# Patient Record
Sex: FEMALE | Race: WHITE
[De-identification: names, ages, dates, MRNs, and addresses within clinical notes are randomized per-mention and may not be internally consistent; named-entity substitution may affect disease eponyms.]

---

## 2019-07-04 NOTE — NUR
PT AOX3 WITH A LOT OF ANXIETY AND NERVOUSNESS RELATED TO DETOXING FROM DRUGS.
PT IS TREATED WITH ATIVAN FOR ANXIETY. PT PACES IN ROOM AND IS DISCOURAGED TO
LEAVE THE ROOM FOR SMOKING OUTSIDE. NICOTINE PATCH IN PLACE. PT HAS PAIN IN
HER HANDS WITH BILATERAL CELLULITIS AND IS TREATED PER EMAR. PT IS AGITATED
OFF AND ON, BUT SO FAR IS REDIRECTABLE. WILL CONTINUE TO MONITOR.

## 2019-07-04 NOTE — NUR
SHIFT SUMMARY:
PATIENT ARRIVED TO THE FLOOR FROM ER VIA WC. TRANSFERRED SELF TO BED. SHE WAS
VERY ANXIOUS, BUT AOX3, FLAT EFFECT, AND WITHDRAWN. THEY STATE THAT SHE ACTED
EMBARRED DOWN IN THE ER. PATIENT ADMITTED TO DAILY IV HERION USE UP TO 4-5
TIMES A DAY, AND METH USE FOR SUBSITUTE. LAST TIME USED WAS RIGHT BEFORE
COMMING TO THE HOSPTIAL. SHE CAME IN DO TO WORSENING ABSCESS TO THE BILATERAL
HANDS AND LEFT AC AREA. BILATERAL HANDS ARE SWOLLEN WITH CELLULITIS THE RIGHT
HAS A TRACK TO THE SURFACE AND IS DRAINING, MILD SEROUS SANGUOUS. THE LEFT HAS
NOT OPEN AS OF YET, THE LEFT AC HAS OPENED AND HAS A DEEP WHOLE AND DRAINING,
SHE REFUSED TO HAVE THEM COVERED OR MEASURED AS THEY DID IT IN THE ER. SHE HAS
DENIED PAIN BUT HAS SHOWN SIGNS OF WITHDRAWL WITH IN HOURS OF BEING HERE. SHE
IS VERY ANXIOUS UP AND DOWN IN HER ROOM PACING THE FLOOR AND CONSTANTLY MOVING
AROUND STATING THAT SHE FEELS SHE WILL LOSE IT, ATIVAN HAS BEEN GIVEN AFTER A
CALL TO THE DOCTOR AND IT CHANGED TO EVERY 3 HOURS. SHE WAS FOUND LATER TRYING
TO GO OUTSIDE TO SMOKE DISPITE THE NICOTINE PATCH SHE HAS ON. INFORMED HER SHE
IS NOT TO LEAVE TILL WHILE THE IV IS IN PLACE. SHE WAS NOT HAPPY ABOUT THAT.
ENCOURAGED HER TO TRY TO REST BUT SHE HAS NOT. SHE DID HAVE A VISIT FROM HER
DAUGHTER AND SON, WHICH SON WAS AS YOUNG AS 5 YEARS OLD. THIS DID NOT HELP HER
WANTING TO GO HOME. SO FAR SHE HAS REMAINED CALM AND HAS NOT ESCALATED TO
BEHAVIORAL ISSUES. DR. JOAQUIN WAS CONSULTED FOR POSSIBLE I&D. NO OTHER CHANGES
HAVE OCCURED, WILL REPORT TO DAY SHIFT RN.

## 2019-07-04 NOTE — NUR
PATIENT JUST TRIED TO GO OUTSIDE TO SMOKE, FOUND BY ELEVATOR READY TO GO
DOWNSTAIRS, SHE IS VERY ANCIOUS AND JITTERY, WITHDRAWLING FROM HER HERION AND
METH USE ON ADMISSION, AS SOON AS WE GOT BACK TO ROOM SHE TEXTED SOMEONE ON
HER PHONE. INFORMED HER THAT WE CAN NOT HAVE HER GO OUTSIDE TO SMOKE THAT AS
LONG AS THE IV ARE IN SHE CAN NOT LEAVE. SHE COOPERATED TO GO BACK TO THE
ROOM.

## 2019-07-05 NOTE — NUR
Ambulatory in ROOM, FLAT AFFECT, MINIMAL VERBAL, STANDS, TRANSFERS TO [facility],
DENIES NEED TO VOID WHEN OFFERED, DECLINES. SIGNS BLOOD CONSENT.
History, Chart, Medications and Allergies reviewed before start of
procedure.Lungs clear T/O to Auscultation.
Patient confirms NPO status and agrees with scheduled surgery.
Pre-Op teaching done. Pt verbalizes understanding.

## 2019-07-05 NOTE — NUR
PT IS ANSWERING AND TALKING WITH RN. STATES THAT 25MCG IV FENT DID NOT "DO
MUCH" TO PAIN. PT NOW ASKING TO SOMETHING TO DRINK AND WANTS TO GO BACK TO
ROOM. REPORT WAS GIVEN TO TORI VASQUEZ. PT IS STABLE AT THIS TIME TO BE TRANSFERED
TO MEDICAL FLOOR.

## 2019-07-05 NOTE — NUR
SHIFT SUMMARY
PT VERY FLAT AFFECT DOESN'T SEEM TO RESPOND TO QUESTIONS, KIND OF A CONSTANT
BLANK STARE. NPO SINCE MIDNIGHT. SHE TENDS TO WANDER HALLS WITH GOWN OPEN IN
BACK C BLANK STARE.
REDIRECTED TO ROOM X3. SHE LAYED IN BED AND DOZED ON AND OFF, TOSSING IN
BED ON AND OFF T/O NIGHT.

## 2019-07-05 NOTE — NUR
PT IS AOX3 VERY FLAT AFFECT. PT IS STILL WITHDRAWING FROM HER DRUG USE. PT WAS
OUT MOST OF THE DAY FOR IND TO HER L ELBOW AND BOTH HANDS. WOUND VAC ON L
ELBOW IS RUNNING WELL AT THIS TIME AND HAND BANDAGES ARE CLEAN. PT TREATED FOR
PAIN PER EMAR. PT IS A ONE PERSON ELVIS AT THIS TIME AS SHE RECOVERS FROM
SEDATATION AND GETS HELP WITH WOUND VAC AND IV LINES. VITALS ARE STABLE WILL
CONTINUE TO MONITOR. PT DOES NOT USE CALL LIGHT. BED ALARM IN PLACE.

## 2019-07-06 NOTE — NUR
SHIFT SUMMARY
PT VERY IRRITABLE FLAT AFFECT DOESN'T LIKE TO RESPOND. C/O PAIN AND MEDICATED
PER EMAR FOR PAIN AND ANXIETY. VERY IRRITABLE ABOUT THE IV AND PUMP GOING OFF
MULTIPLE TIMES. WOUND VAC TO L ELBOW DRAINING ABOUT 50 OF DARK RED BLOOD. IV
GOT PULLED OUT FROM L SIDE OF NECK AND SHE WON'T LET US TOUCH R FOOT IV SINCE
SHE SAYS IT HURTS TOO MUCH. IV SITE LOST AND SHE REFUSED ANOTHER IV AT THE
MOMENT. WCTM. PASS ON TO DAY RN.

## 2019-07-06 NOTE — NUR
DRESSING CHANGE
 
MARA DA SILVA CAME AND CHANGED BOTH HAND DRESSINGS, BILAT HANDS PACKED WITH
IODOFORM PACKING AND COVERED WITH GAUZE 4X4'S, GAUZE ROLL, AND ACE WRAP, PT
CRIED OUT AND SWORE DURING DRESSING CHANGES, EXPLAINED TO THE PT THE
IMPORTANCE OF DRESSING CHANGES, AND IV ANTIBIOTICS

## 2019-07-06 NOTE — NUR
SUMMARY
 
PT RESTING QUIETLY IN BED, HAS BEEN MED PER EMAR FOR PAIN AND ANXIETY, PT HAS
BEEN INDEPENDENT IN THE ROOM, PT HAS HAD FAMILY IN TO VISIT, PT HAS BEEN OUT
OF THE ROOM TO GO FOR A WALK, WOUND VAC REMAINS INTACT, VSS, NO ACUTE CHANGES,
WILL CONT TO MONITOR

## 2019-07-07 NOTE — NUR
SUMMARY
 
PT RESTING QUIETLY IN BED, PT WAKES EASILY, PT HAS HAD BOTH HAND DRESSINGS
CHANGED AND THE WOUND VAC DRESSING CHANGED TODAY BY DR BARBOZA AND BARBY DA SILVA,
PAIN MEDS INCREASED TO Q4HP, FOR INCREASED PAIN, PT HAS HAD FAMILY COME IN TO
VISIT AND PT HAS BEEN UP TO WALK, VSS, NO ACUTE CHANGES, WILL CONT TO MONITOR

## 2019-07-07 NOTE — NUR
SUMMARY: A/OX3, INDEPENDENT TO BSC AND SPECIFIES NEEDS. SHE'S BEEN VERY TIRED
THIS SHIFT AND SLEPT MAJORITY OF NOCTE. SHE AWOKE BRIEFLY TO ANSWER Q'S
SUCCINTLY THEN FELL BACK TO SLEEP. SHE'S BEEN FLAT AND WITHDRAWN W/MINIMAL
INTERACTION. PT WAS MEDICATED X1 W/NORCO FOR TOLERABLE CONTROL OF L.ELBOW
PAIN. WOUND VAC DX AND SUCTION REMAINS C/D/I TO L.ELBOW, VAC TO BE CHANGED
TODAY. BILAT HAND DX'S W/ACE WRAP ARE ALSO C/D/I AND NOTED TO BE SWOLLEN/PINK.
NS INFUSES AT TKO AND IV ABX WERE RECIEVED. NO ACUTE CHANGES, VSS/AFEBRILE.
WCTM AND REPORT TO DAY RN.

## 2019-07-08 NOTE — NUR
PT AO TODAY AND COOPERATIVE OF ALL CARE. PT DOES REQUEST TO GO OUTSIDE AND HAS
SOME ANXIETY WHICH WAS TREATED PER EMAR. PT WAS ALSO TREATED FOR PAIN IN HANDS
AND L ELBOW PER EMAR. PT DOING MUCH BETTER AND IS ABLE TO CARRY ON AND
CONVERSATION MUCH BETTER THAN WHEN SHE FIRST ARRIVED. WOUND VAC RUNNING WELL
AND WILL CONTINUE TO MONITOR.

## 2019-07-08 NOTE — NUR
SUMMARY: A/OX4, INDEPENDENT AND SPECIFIES NEEDS. PT WENT OUTSIDE TO SMOKE AND
HER CHILDREN WERE HERE TO VISIT THIS SHIFT. BILAT HAND DX'S W/ACE WRAP REMAIN
C/D/I. L.AC WOUND VAC AND SUCTION ARE ALSO INTACT W/ALL DX'S CHANGED ON 7/7/19
DAY SHIFT. A NEW IV WAS PLACED W/US ASSIST BY CHARGE RN (RAMON ALBARADO) D/T
PREVIOUS INFILTRATING AND DIFFICULT IV START. NS AT TKO AND IV ABX WERE
RECIEVED. PT USED BSC INDEPENDENTLY BUT HAD 1 EPISODE INCONTINENCE WHILE
ASLEEP. NORCO WAS RECIEVED FOR TOLERABLE PAIN CONTROL AND ATIVAN WASN'T
REQ'D THIS SHIFT. NO ACUTE CHANGES, VSS/AFEBRILE. WCTM AND REPORT TO DAY RN.

## 2019-07-09 NOTE — NUR
PT AOX4 AND COOPERATIVE OF CARE. PT HAD DR IN TODAY TO CHANGE ALL BANDAGES AND
WOUND VAC. PT HAS BEEN WALKING OUTSIDE X3. PT TREATED FOR L ELBOW AND
BILATERAL HAND PAIN PER EMAR. WILL CONTINUE TO MONITOR.

## 2019-07-09 NOTE — NUR
SHIFT SUMMARY: 43 Y/O OBESE FEMALE RESTED COMFORTABLY ALL SHIFT. PTS IS ALERT
AND ORIENTED X 4, FLIGHT OF IDEAS AT TIMES NOTED WITH THOUGHT PROCESS
DISORGANIZED. PTS DRESSING TO BILATERAL HANDS AND WOUND VAC TO LEFT ELBOW
INTACT. PTS DAUGHTER WAS AT SIDE LAST NIGHT VISITING. PT WENT OUTSIDE TWICE
LAST NIGHT TO SMOKE CIGARETTES. PT DENIES PAIN OR NAUSEA, BED LOW POSITION,
CALL LIGHT AT SIDE.

## 2019-07-09 NOTE — NUR
PATIENT /131 ANXIUOS AND IN PAIN. ATIVAN IV GIVEN AND PAIN MEDICATION
PER EMAR. PATIENT OUT TO SMOKE.
 
REASSESS: 154/75. PATIENT LESS ANXIOUS AND REPORTED REDUCED PAIN.

## 2019-07-10 NOTE — NUR
SHIFT SUMMARY
PATIENT HAD NO ACUTE CHANGES OBSERVED THIS SHIFT. AXOX 3 AND INDEPENDENT
OUTSIDE. PATIENT REPORTED ANXIETY AND PAIN AND RECEIVED NORCO AND ATIVAN IV
PER EMAR. PATIENT ABLE TO RELAX AND REPORTED REDUCED PAIN. PIV REMAINS INTACT.
IV ABX INFUSED. WOUND VAC LEFT ELBOW. TAKES MEDICATION WHOLE WITH WATER. CALL
LIGHT IN REACH. BED IN LOWEST POSITION. WILL CONTINUE TO MONITOR UNTIL DAY
SHIFT NURSE ASSUMES CARE.

## 2019-07-10 NOTE — NUR
SHIFT SUMMARY
 
NO ACUTE CHANGES. PATIENT MEDICATED X2 FOR PAIN, DENIES NAUSEA AND SHORTNESS
OF BREATH. PATIENT DISCHARGE PLANNED FOR TOMORROW. PATIENT WILL NEED HOME
WOUND VAC APPLIED BEFORE DISCHARGE. PATIENT'S NEXT WOUND VAC CHANGES WILL BE
IN THE WOUND CLINIC ON FRIDAY AT NOON AND TUESDAY AT 0730 AM. PATIENT'S
SIGNIFICANT OTHER WILL NEED TO ATTEND APPOINTMENTS TO LEARN WOUND VAC
APPLICATION. PATIENT UP INDEPENDENT IN THE ROOM AND GOES OUTSIDE TO SMOKE
SEVERAL TIMES A DAY. CALL LIGHT IN REACH, WILL CONTINUE TO MONITOR.

## 2019-07-11 NOTE — NUR
SHIFT SUMMARY
PT AWAKE MUCH OF SHIFT, SIGNIFICANT OTHER CAME LATE IN THE EVENING. PT
SHOWERED TONIGHT. DRESSINGS ON BOTH HANDS CHANGED. PT AMBULATORY PER SELF IN
[facility] AND [facility]. ALERT AND ORIENTED. PT MEDICATED FOR PAIN X1 THIS SHIFT.
WOUND VAC TO SUCTION ON LEFT AC. NO ACUTE EVENTS NOTED DURING THE NIGHT, WILL
CONTINUE TO MONITOR.

## 2019-07-11 NOTE — NUR
PATIENT DISCHARGE
 
THE PATIENT WAS DISCHARGED HOMR WITH HER S/O, AFTER DISCHARGE INSTRUCTIONS
WERE GIVEN TO THE PATIENT. THE PATIENT ACKNOWLEDED THE NEED FOR FOLLOW UP
APPOINTMENTS AND PRESCRIPTION AND AGREED TO DO THEM. THE PATIENT LEFT THE
HOSPIITAL WITHOUT CONCERN OR COMPLAINT.

## 2022-11-29 ENCOUNTER — HOSPITAL ENCOUNTER (INPATIENT)
Dept: HOSPITAL 95 - ER | Age: 47
LOS: 1 days | Discharge: HOME | DRG: 871 | End: 2022-11-30
Attending: HOSPITALIST | Admitting: HOSPITALIST
Payer: COMMERCIAL

## 2022-11-29 VITALS — HEIGHT: 66 IN | BODY MASS INDEX: 38.57 KG/M2 | WEIGHT: 240 LBS

## 2022-11-29 DIAGNOSIS — F11.10: ICD-10-CM

## 2022-11-29 DIAGNOSIS — Z79.899: ICD-10-CM

## 2022-11-29 DIAGNOSIS — F17.210: ICD-10-CM

## 2022-11-29 DIAGNOSIS — B18.2: ICD-10-CM

## 2022-11-29 DIAGNOSIS — Z98.890: ICD-10-CM

## 2022-11-29 DIAGNOSIS — E87.6: ICD-10-CM

## 2022-11-29 DIAGNOSIS — Z79.2: ICD-10-CM

## 2022-11-29 DIAGNOSIS — A41.9: Primary | ICD-10-CM

## 2022-11-29 DIAGNOSIS — J10.00: ICD-10-CM

## 2022-11-29 DIAGNOSIS — G43.909: ICD-10-CM

## 2022-11-29 DIAGNOSIS — Z90.49: ICD-10-CM

## 2022-11-29 DIAGNOSIS — Z79.51: ICD-10-CM

## 2022-11-29 DIAGNOSIS — J44.0: ICD-10-CM

## 2022-11-29 DIAGNOSIS — Z79.891: ICD-10-CM

## 2022-11-29 DIAGNOSIS — Z20.822: ICD-10-CM

## 2022-11-29 DIAGNOSIS — Z88.8: ICD-10-CM

## 2022-11-29 DIAGNOSIS — F19.10: ICD-10-CM

## 2022-11-29 LAB
ALBUMIN SERPL BCP-MCNC: 2.6 G/DL (ref 3.4–5)
ALBUMIN/GLOB SERPL: 0.6 {RATIO} (ref 0.8–1.8)
ALT SERPL W P-5'-P-CCNC: 28 U/L (ref 12–78)
ANION GAP SERPL CALCULATED.4IONS-SCNC: 6 MMOL/L (ref 6–16)
ANION GAP SERPL CALCULATED.4IONS-SCNC: 7 MMOL/L (ref 6–16)
AST SERPL W P-5'-P-CCNC: 21 U/L (ref 12–37)
BASOPHILS # BLD: 0 K/MM3 (ref 0–0.23)
BASOPHILS NFR BLD: 0 % (ref 0–2)
BILIRUB SERPL-MCNC: 0.7 MG/DL (ref 0.1–1)
BUN SERPL-MCNC: 17 MG/DL (ref 8–24)
BUN SERPL-MCNC: 22 MG/DL (ref 8–24)
CALCIUM SERPL-MCNC: 8.3 MG/DL (ref 8.5–10.1)
CALCIUM SERPL-MCNC: 8.5 MG/DL (ref 8.5–10.1)
CHLORIDE SERPL-SCNC: 100 MMOL/L (ref 98–108)
CHLORIDE SERPL-SCNC: 96 MMOL/L (ref 98–108)
CO2 SERPL-SCNC: 28 MMOL/L (ref 21–32)
CO2 SERPL-SCNC: 29 MMOL/L (ref 21–32)
CREAT SERPL-MCNC: 0.83 MG/DL (ref 0.4–1)
CREAT SERPL-MCNC: 0.84 MG/DL (ref 0.4–1)
DEPRECATED RDW RBC AUTO: 42.2 FL (ref 35.1–46.3)
EOSINOPHIL # BLD: 0 K/MM3 (ref 0–0.68)
EOSINOPHIL NFR BLD: 0 % (ref 0–6)
ERYTHROCYTE [DISTWIDTH] IN BLOOD BY AUTOMATED COUNT: 12.9 % (ref 11.7–14.2)
FLUAV RNA SPEC QL NAA+PROBE: POSITIVE
FLUBV RNA SPEC QL NAA+PROBE: NEGATIVE
GLOBULIN SER CALC-MCNC: 4.3 G/DL (ref 2.2–4)
GLUCOSE SERPL-MCNC: 86 MG/DL (ref 70–99)
GLUCOSE SERPL-MCNC: 88 MG/DL (ref 70–99)
HCT VFR BLD AUTO: 41.1 % (ref 33–51)
HGB BLD-MCNC: 14.3 G/DL (ref 11.5–16)
LYMPHOCYTES # BLD: 2.86 K/MM3 (ref 0.84–5.2)
LYMPHOCYTES NFR BLD: 19 % (ref 21–46)
MCHC RBC AUTO-ENTMCNC: 34.8 G/DL (ref 31.5–36.5)
MCV RBC AUTO: 89 FL (ref 80–100)
MONOCYTES # BLD: 1 K/MM3 (ref 0.16–1.47)
MONOCYTES NFR BLD: 7 % (ref 4–13)
NEUTS BAND NFR BLD MANUAL: 10 % (ref 0–8)
NEUTS SEG # BLD MANUAL: 10.46 K/MM3 (ref 1.96–9.15)
NEUTS SEG NFR BLD MANUAL: 63 % (ref 41–73)
NRBC # BLD AUTO: 0 K/MM3 (ref 0–0.02)
NRBC BLD AUTO-RTO: 0 /100 WBC (ref 0–0.2)
PLATELET # BLD AUTO: 126 K/MM3 (ref 150–400)
POTASSIUM SERPL-SCNC: 3.2 MMOL/L (ref 3.5–5.5)
POTASSIUM SERPL-SCNC: 3.5 MMOL/L (ref 3.5–5.5)
PROT SERPL-MCNC: 6.9 G/DL (ref 6.4–8.2)
RSV RNA SPEC QL NAA+PROBE: NEGATIVE
SARS-COV-2 RNA RESP QL NAA+PROBE: NEGATIVE
SODIUM SERPL-SCNC: 132 MMOL/L (ref 136–145)
SODIUM SERPL-SCNC: 134 MMOL/L (ref 136–145)
TOTAL CELLS COUNTED BLD: 100
VARIANT LYMPHS NFR BLD MANUAL: 1 % (ref 0–0)

## 2022-11-29 PROCEDURE — A9270 NON-COVERED ITEM OR SERVICE: HCPCS

## 2022-11-29 NOTE — NUR
Patient admitted this shift for sepsis/pneumonia, plan is to give IV fluids &
IV ABX. Patient is on room air, productive cough, lung sounds wheezing/fine
crackles at bases. Vitals stable, afebrile.
Patient takes methadone daily, confirmed Rx script with adapt, daily dose of
100mg ordered. Med rec completed. Patient consented to skin check by 2 RNs,
skin WNL, except small wound noted on lower back, wound bed covered with
yellow tissue.
Positive for Influenza A, droplet precautions in place. Will continue plan of
care.

## 2022-11-29 NOTE — NUR
PATIENT PRESENTS WITH DIAGNOSIS OF PNEUMONIA AND INFLUENZA. A&OX4. PATIENT
EFFECTIVELY COMMUICATES NEEDS. COOPERATIVE AND PLEASANTLY INTERACTS WITH CARE
STAFF. BED LOW AND LOCKED. CALL LIGHT WITHIN REACH.
 
VSS. RR EVEN AND UNLABORED ON RA. RIGHT AC IV AND LUE POWERGLIDE PATENT AND
WITHOUT SIGNS OF INFECTION.
 
NO ACUTE CONCERNS AT THIS TIME. WILL CONTINUE TO CLOSELY MONITOR.

## 2022-11-30 LAB
ANION GAP SERPL CALCULATED.4IONS-SCNC: 4 MMOL/L (ref 6–16)
BASOPHILS # BLD AUTO: 0.02 K/MM3 (ref 0–0.23)
BASOPHILS NFR BLD AUTO: 0 % (ref 0–2)
BUN SERPL-MCNC: 17 MG/DL (ref 8–24)
CALCIUM SERPL-MCNC: 8.3 MG/DL (ref 8.5–10.1)
CHLORIDE SERPL-SCNC: 104 MMOL/L (ref 98–108)
CO2 SERPL-SCNC: 30 MMOL/L (ref 21–32)
CREAT SERPL-MCNC: 0.66 MG/DL (ref 0.4–1)
DEPRECATED RDW RBC AUTO: 41.9 FL (ref 35.1–46.3)
EOSINOPHIL # BLD AUTO: 0.05 K/MM3 (ref 0–0.68)
EOSINOPHIL NFR BLD AUTO: 1 % (ref 0–6)
ERYTHROCYTE [DISTWIDTH] IN BLOOD BY AUTOMATED COUNT: 12.8 % (ref 11.7–14.2)
GLUCOSE SERPL-MCNC: 83 MG/DL (ref 70–99)
HCT VFR BLD AUTO: 38.1 % (ref 33–51)
HGB BLD-MCNC: 13.1 G/DL (ref 11.5–16)
IMM GRANULOCYTES # BLD AUTO: 0.03 K/MM3 (ref 0–0.1)
IMM GRANULOCYTES NFR BLD AUTO: 0 % (ref 0–1)
LYMPHOCYTES # BLD AUTO: 3.79 K/MM3 (ref 0.84–5.2)
LYMPHOCYTES NFR BLD AUTO: 45 % (ref 21–46)
MCHC RBC AUTO-ENTMCNC: 34.4 G/DL (ref 31.5–36.5)
MCV RBC AUTO: 90 FL (ref 80–100)
MONOCYTES # BLD AUTO: 0.62 K/MM3 (ref 0.16–1.47)
MONOCYTES NFR BLD AUTO: 7 % (ref 4–13)
NEUTROPHILS # BLD AUTO: 3.84 K/MM3 (ref 1.96–9.15)
NEUTROPHILS NFR BLD AUTO: 46 % (ref 41–73)
NRBC # BLD AUTO: 0 K/MM3 (ref 0–0.02)
NRBC BLD AUTO-RTO: 0 /100 WBC (ref 0–0.2)
PLATELET # BLD AUTO: 129 K/MM3 (ref 150–400)
POTASSIUM SERPL-SCNC: 3.7 MMOL/L (ref 3.5–5.5)
SODIUM SERPL-SCNC: 138 MMOL/L (ref 136–145)

## 2022-11-30 NOTE — NUR
SHIFT SUMMARY
PTN D/C TODAY. SWITCHED TO ORAL ANTIBIOTICS. CALL FROM YOBANY PRESLEY FOR 4-SECOND
TELEMETRY HR IN HIGH 30'S, AFTERWARDS SUSTAINED HR 50'S. DIMISHED LUNG SOUNDS
WITH WHEEZING NOTED IN UPPER LUNGS ON EXPIRATION. COUGH, NONPRODUCTIVE. PTN
SLEPT DURING MORNING OF SHIFT, STATED REALLY DID NOT SLEEP GOOD HERE AND WILL
MOST LIKELY GET BETTER SLEEP AT HOME. EDUCATED ON MOVVEMENT OF LUNGS.
PERIPHERAL IV REMOVED FROM RIGHT ARM AND TELEMETRY BY JOHN ALONZO AND POWER
GLIDE IN LEFT UPPER ARM REMOVED BY LAKISHA VASQUEZ. ISOLATION PRECAUTIONS FOR
INFLUENZA IN PLACE.  D/C PAPERWORK REVIEWED WITH PTN, MEDICATIONS FAXED TO
PHARMACY. PTN TRANSPORTED BY JOHN ALONZO TO EXIT.

## 2022-11-30 NOTE — NUR
SHIFT SUMMARY
 
THE PATIENT IS A 47 YEAR-OLD FEMALE WITH A DIAGNOSIS OF INFLUENZA A AND A
LEFT-SIDED PLEURAL EFFUSION. A&OX4. PATIENT EFFECTIVELY COMMUNICATES NEEDS.
CALL LIGHT WITHIN REACH. BED LOW AND LOCKED. PATIENT AMBULATES INDEPENDENTLY
TO REST ROOM. LS DIMINISHED THROUGHOUT. RR EVEN AND UNLABORED ON RA. PATIENT
DENIES SOB AND/OR CP. APAP ADMINISTERED PER EMAR FOR GENERALIZED PAIN. PATIENT
OBSERVED TO BE SLEEPING COMFORTABLY THROUGHOUT THE NIGHT. NO ACUTE CHANGES
THIS SHIFT. WILL CONTINUE TO CLOSELY MONITOR.

## 2023-03-02 ENCOUNTER — HOSPITAL ENCOUNTER (EMERGENCY)
Dept: HOSPITAL 95 - ER | Age: 48
Discharge: HOME | End: 2023-03-02
Payer: COMMERCIAL

## 2023-03-02 VITALS — WEIGHT: 240 LBS | HEIGHT: 66 IN | BODY MASS INDEX: 38.57 KG/M2

## 2023-03-02 DIAGNOSIS — Z88.8: ICD-10-CM

## 2023-03-02 DIAGNOSIS — Z79.899: ICD-10-CM

## 2023-03-02 DIAGNOSIS — F17.210: ICD-10-CM

## 2023-03-02 DIAGNOSIS — G51.0: Primary | ICD-10-CM
